# Patient Record
Sex: FEMALE | Race: BLACK OR AFRICAN AMERICAN | NOT HISPANIC OR LATINO | Employment: UNEMPLOYED | URBAN - METROPOLITAN AREA
[De-identification: names, ages, dates, MRNs, and addresses within clinical notes are randomized per-mention and may not be internally consistent; named-entity substitution may affect disease eponyms.]

---

## 2023-10-16 ENCOUNTER — OFFICE VISIT (OUTPATIENT)
Dept: OPHTHALMOLOGY | Facility: CLINIC | Age: 43
End: 2023-10-16
Payer: COMMERCIAL

## 2023-10-16 DIAGNOSIS — H52.13 MYOPIA, BILATERAL: Primary | ICD-10-CM

## 2023-10-16 DIAGNOSIS — H18.893 LIMBAL STEM CELL DEFICIENCY OF BOTH EYES: ICD-10-CM

## 2023-10-16 PROCEDURE — FLVLF CONTACT LENS EVALUATION (SP): Performed by: OPTOMETRIST

## 2023-10-16 PROCEDURE — 92015 DETERMINE REFRACTIVE STATE: CPT | Performed by: OPTOMETRIST

## 2023-10-16 PROCEDURE — 92014 COMPRE OPH EXAM EST PT 1/>: CPT | Performed by: OPTOMETRIST

## 2023-10-16 ASSESSMENT — CONF VISUAL FIELD
OD_INFERIOR_NASAL_RESTRICTION: 0
OS_SUPERIOR_TEMPORAL_RESTRICTION: 0
OD_SUPERIOR_NASAL_RESTRICTION: 0
OD_NORMAL: 1
OD_SUPERIOR_TEMPORAL_RESTRICTION: 0
OS_SUPERIOR_NASAL_RESTRICTION: 0
OD_INFERIOR_TEMPORAL_RESTRICTION: 0
METHOD: COUNTING FINGERS
OS_INFERIOR_TEMPORAL_RESTRICTION: 0
OS_NORMAL: 1
OS_INFERIOR_NASAL_RESTRICTION: 0

## 2023-10-16 ASSESSMENT — ENCOUNTER SYMPTOMS
ALLERGIC/IMMUNOLOGIC NEGATIVE: 0
MUSCULOSKELETAL NEGATIVE: 0
CONSTITUTIONAL NEGATIVE: 0
PSYCHIATRIC NEGATIVE: 0
CARDIOVASCULAR NEGATIVE: 0
HEMATOLOGIC/LYMPHATIC NEGATIVE: 0
NEUROLOGICAL NEGATIVE: 0
EYES NEGATIVE: 0
RESPIRATORY NEGATIVE: 0
ENDOCRINE NEGATIVE: 0
GASTROINTESTINAL NEGATIVE: 0

## 2023-10-16 ASSESSMENT — REFRACTION_CURRENTRX
OD_AXIS: 180
OS_BRAND: AIR OPTIX AQUA COLORS
OS_AXIS: 180
OS_BASECURVE: 8.6
OD_BRAND: AIR OPTIX AQUA COLORS
OS_SPHERE: -4.00
OD_DIAMETER: 14.2
OD_SPHERE: -4.00
OD_BASECURVE: 8.6
OS_CYLINDER: -0.75
OS_DIAMETER: 14.2
OD_CYLINDER: -0.75

## 2023-10-16 ASSESSMENT — REFRACTION
OD_AXIS: 150
OD_CYLINDER: -0.50
OS_SPHERE: -3.50
OD_SPHERE: -3.25

## 2023-10-16 ASSESSMENT — SLIT LAMP EXAM - LIDS
COMMENTS: NORMAL
COMMENTS: NORMAL

## 2023-10-16 ASSESSMENT — VISUAL ACUITY
OD_CC: 20/20
METHOD: SNELLEN - LINEAR
OS_CC: 20/20
VA_OR_OS_CURRENT_RX: 20/20
VA_OR_OD_CURRENT_RX: 20/20
CORRECTION_TYPE: GLASSES
OS_CC: 20/20
OD_CC: 20/20

## 2023-10-16 ASSESSMENT — REFRACTION_WEARINGRX
OD_CYLINDER: -0.50
OD_SPHERE: -3.25
OD_AXIS: 150
OS_SPHERE: -3.50
OS_CYLINDER: SPHERE

## 2023-10-16 ASSESSMENT — EXTERNAL EXAM - RIGHT EYE: OD_EXAM: NORMAL

## 2023-10-16 ASSESSMENT — REFRACTION_MANIFEST
OD_SPHERE: -3.25
OS_SPHERE: -3.50
OD_CYLINDER: -0.50
OD_AXIS: 150

## 2023-10-16 ASSESSMENT — CUP TO DISC RATIO
OD_RATIO: 0.40
OS_RATIO: 0.40

## 2023-10-16 ASSESSMENT — TONOMETRY
OD_IOP_MMHG: 14
OS_IOP_MMHG: 15
IOP_METHOD: GOLDMANN APPLANATION

## 2023-10-16 ASSESSMENT — EXTERNAL EXAM - LEFT EYE: OS_EXAM: NORMAL

## 2023-10-16 NOTE — PROGRESS NOTES
"Assessment/Plan   Diagnoses and all orders for this visit:  Myopia, bilateral  New spec rx released today per patient request. Ocular health wnl for age OU. Monitor 1 year or sooner prn. Refraction billed today.  Discussed proper wear, care, replacement of contact lenses. Gave handout. D/c cl wear and RTC if eyes become red, painful, irritated. Monitor 1 year.   CL eval billed today. 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 Continue w/ proper CL wear. RTC w/ worsening RSVP. Recommend use of artificial tears bid-qid OU. Discussed ATs work best when used consistently multiple times a day. Discussed brand options and preserved vs. PF. Avoid visine/cleareyes/\"get the red out\" drops. Coupon given.    "

## 2024-05-30 ENCOUNTER — OFFICE VISIT (OUTPATIENT)
Dept: OPHTHALMOLOGY | Facility: CLINIC | Age: 44
End: 2024-05-30
Payer: COMMERCIAL

## 2024-05-30 DIAGNOSIS — H04.123 DRY EYE SYNDROME OF BOTH EYES: ICD-10-CM

## 2024-05-30 DIAGNOSIS — H10.13 ALLERGIC CONJUNCTIVITIS OF BOTH EYES: Primary | ICD-10-CM

## 2024-05-30 PROCEDURE — 99213 OFFICE O/P EST LOW 20 MIN: CPT | Performed by: STUDENT IN AN ORGANIZED HEALTH CARE EDUCATION/TRAINING PROGRAM

## 2024-05-30 ASSESSMENT — CONF VISUAL FIELD
OS_NORMAL: 1
OD_SUPERIOR_NASAL_RESTRICTION: 0
OD_INFERIOR_NASAL_RESTRICTION: 0
OD_NORMAL: 1
OS_SUPERIOR_TEMPORAL_RESTRICTION: 0
OD_SUPERIOR_TEMPORAL_RESTRICTION: 0
OS_SUPERIOR_NASAL_RESTRICTION: 0
OS_INFERIOR_NASAL_RESTRICTION: 0
OD_INFERIOR_TEMPORAL_RESTRICTION: 0
OS_INFERIOR_TEMPORAL_RESTRICTION: 0

## 2024-05-30 ASSESSMENT — ENCOUNTER SYMPTOMS
EYES NEGATIVE: 0
CONSTITUTIONAL NEGATIVE: 0
HEMATOLOGIC/LYMPHATIC NEGATIVE: 0
ENDOCRINE NEGATIVE: 0
ALLERGIC/IMMUNOLOGIC NEGATIVE: 0
NEUROLOGICAL NEGATIVE: 0
GASTROINTESTINAL NEGATIVE: 0
RESPIRATORY NEGATIVE: 0
PSYCHIATRIC NEGATIVE: 0
MUSCULOSKELETAL NEGATIVE: 0
CARDIOVASCULAR NEGATIVE: 0

## 2024-05-30 ASSESSMENT — TONOMETRY
OS_IOP_MMHG: 16
OD_IOP_MMHG: 13
IOP_METHOD: TONOPEN

## 2024-05-30 ASSESSMENT — EXTERNAL EXAM - RIGHT EYE: OD_EXAM: NORMAL

## 2024-05-30 ASSESSMENT — VISUAL ACUITY
OS_CC: 20/25
OS_CC+: +3
OD_CC: 20/20
OS_PH_CC: 20/20
METHOD: SNELLEN - LINEAR
OS_PH_CC+: -1

## 2024-05-30 ASSESSMENT — EXTERNAL EXAM - LEFT EYE: OS_EXAM: NORMAL

## 2024-05-30 NOTE — PROGRESS NOTES
#Concretions  #Allergic conjunctivitis vs papillary reaction from CL  - 42 yo female with past ocular hx of limbal stem cell deficiency, myopia, CL use presenting for 1 week of left eye FBS, irritation, mucoid drainage, with no recent sick contacts or trauma to the eye.   - Entrance exam with excellent VA OU, normal IOP.   - SLE notable for mild papillary reaction OU, nasal coalesced concretions along MICHAEL, MGD, but no significant dry eye or conj abrasion.   - Overall, suspect symptoms are 2/2 possible allergic conjunctivitis vs MGD/evaporative dry eye  - no signs of limbal stem cell deficiency   - no signs of viral or bacterial infection    Recommendations:   - Start Olopatadine or Zaditor antihistamine eye drops BID, both eyes  - Start artificial tears qid, both eyes  - Start warm compresses BID   - Recommend CL holiday for 3-5 days  - F/u yearly as planned with Dr. Marcus, RTC sooner prn

## 2024-07-05 ENCOUNTER — APPOINTMENT (OUTPATIENT)
Dept: OPHTHALMOLOGY | Facility: CLINIC | Age: 44
End: 2024-07-05
Payer: COMMERCIAL

## 2024-09-18 ENCOUNTER — APPOINTMENT (OUTPATIENT)
Dept: ENDOCRINOLOGY | Facility: CLINIC | Age: 44
End: 2024-09-18
Payer: COMMERCIAL

## 2024-10-07 ENCOUNTER — APPOINTMENT (OUTPATIENT)
Dept: OPHTHALMOLOGY | Facility: CLINIC | Age: 44
End: 2024-10-07
Payer: COMMERCIAL

## 2024-10-07 DIAGNOSIS — H18.893 LIMBAL STEM CELL DEFICIENCY OF BOTH EYES: ICD-10-CM

## 2024-10-07 DIAGNOSIS — H52.13 MYOPIA, BILATERAL: Primary | ICD-10-CM

## 2024-10-07 PROCEDURE — 92014 COMPRE OPH EXAM EST PT 1/>: CPT | Performed by: OPTOMETRIST

## 2024-10-07 PROCEDURE — FLVLF CONTACT LENS EVALUATION (SP): Performed by: OPTOMETRIST

## 2024-10-07 PROCEDURE — 92015 DETERMINE REFRACTIVE STATE: CPT | Performed by: OPTOMETRIST

## 2024-10-07 RX ORDER — ASCORBIC ACID 500 MG
TABLET ORAL
COMMUNITY

## 2024-10-07 RX ORDER — ADAPALENE AND BENZOYL PEROXIDE GEL, 0.1%/2.5% 1; 25 MG/G; MG/G
GEL TOPICAL
COMMUNITY

## 2024-10-07 ASSESSMENT — REFRACTION_MANIFEST
OD_SPHERE: -3.75
OD_CYLINDER: -0.50
OS_CYLINDER: SPHERE
OS_SPHERE: -3.75
OD_AXIS: 150

## 2024-10-07 ASSESSMENT — REFRACTION_WEARINGRX
OD_AXIS: 150
OD_SPHERE: -3.25
OD_CYLINDER: -0.50
OS_SPHERE: -3.50

## 2024-10-07 ASSESSMENT — REFRACTION
OD_SPHERE: -3.75
OS_CYLINDER: SPHERE
OD_CYLINDER: -0.50
OS_SPHERE: -3.75
OD_AXIS: 170

## 2024-10-07 ASSESSMENT — SLIT LAMP EXAM - LIDS
COMMENTS: NORMAL
COMMENTS: NORMAL

## 2024-10-07 ASSESSMENT — EXTERNAL EXAM - LEFT EYE: OS_EXAM: NORMAL

## 2024-10-07 ASSESSMENT — TONOMETRY
OD_IOP_MMHG: 15
OS_IOP_MMHG: 18
IOP_METHOD: GOLDMANN APPLANATION

## 2024-10-07 ASSESSMENT — ENCOUNTER SYMPTOMS
CONSTITUTIONAL NEGATIVE: 0
NEUROLOGICAL NEGATIVE: 0
EYES NEGATIVE: 0
GASTROINTESTINAL NEGATIVE: 0
ALLERGIC/IMMUNOLOGIC NEGATIVE: 0
CARDIOVASCULAR NEGATIVE: 0
ENDOCRINE NEGATIVE: 0
PSYCHIATRIC NEGATIVE: 0
HEMATOLOGIC/LYMPHATIC NEGATIVE: 0
RESPIRATORY NEGATIVE: 0
MUSCULOSKELETAL NEGATIVE: 0

## 2024-10-07 ASSESSMENT — VISUAL ACUITY
VA_OR_OS_CURRENT_RX: 20/20
OS_CC: 20/20
OD_CC: 20/20
METHOD: SNELLEN - LINEAR
VA_OR_OD_CURRENT_RX: 20/20

## 2024-10-07 ASSESSMENT — EXTERNAL EXAM - RIGHT EYE: OD_EXAM: NORMAL

## 2024-10-07 ASSESSMENT — CONF VISUAL FIELD
OD_NORMAL: 1
OD_INFERIOR_TEMPORAL_RESTRICTION: 0
METHOD: COUNTING FINGERS
OS_NORMAL: 1
OS_INFERIOR_NASAL_RESTRICTION: 0
OD_SUPERIOR_NASAL_RESTRICTION: 0
OS_SUPERIOR_TEMPORAL_RESTRICTION: 0
OS_INFERIOR_TEMPORAL_RESTRICTION: 0
OS_SUPERIOR_NASAL_RESTRICTION: 0
OD_INFERIOR_NASAL_RESTRICTION: 0
OD_SUPERIOR_TEMPORAL_RESTRICTION: 0

## 2024-10-07 ASSESSMENT — REFRACTION_CURRENTRX
OD_DIAMETER: 14.2
OD_BRAND: AIR OPTIX COLORS
OD_CYLINDER: SPHERE
OD_SPHERE: -3.75
OS_BASECURVE: 8.6
OS_DIAMETER: 14.2
OS_CYLINDER: SPHERE
OS_BRAND: AIR OPTIX COLORS
OD_BASECURVE: 8.6
OS_SPHERE: -3.75

## 2024-10-07 ASSESSMENT — CUP TO DISC RATIO
OD_RATIO: 0.4
OS_RATIO: 0.4

## 2024-10-07 NOTE — PROGRESS NOTES
Assessment/Plan   Diagnoses and all orders for this visit:  Myopia, bilateral  New spec rx released today per patient request. Ocular health wnl for age OU. Monitor 1 year or sooner prn. Refraction billed today. Pt consents to receiving glasses Rx today. Patient's/guardian's signature obtained to acknowledge and confirm that a paper copy of glasses Rx was given to patient in compliance with [facility] Eyeglass Rule. Electronic copy of Rx will also be available via [employer].   Discussed proper wear, care, replacement of contact lenses. Gave handout. D/c cl wear and RTC if eyes become red, painful, irritated. Monitor 1 year.   CL eval billed today. 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 Recommend continue w/ approved CL wear and care. Recommend do not sleep in contact lenses.

## 2024-10-07 NOTE — PATIENT INSTRUCTIONS
"  Patient Information on Blepharitis/Meibomian Gland Dysfunction   (MGD)/Dry EYE    DRY EYE IS A CHRONIC PROBLEM THAT REQUIRES CHRONIC, CONSISTENT TREATMENT.  Symptoms include:  eye and eyelid irritation (foreign body sensation like an eyelash or grain of sand);  itchiness of the eye;  stinging/burning of the eye;     dryness of the eye;  tearing of the eyes;  red or bloodshot eyes;   blurry vision;  frequent “styes”  waxing and waning symptoms  eye fatigue and heaviness    What is Blepharitis?     Blepharitis comes from the [place] root “Bleph” meaning eyelid; “itis\" means inflammation.  So blepharitis is persistent in?ammation of the eyelids. Meibomian gland dysfunction is inflammation of the eyelid glands (MGD).      WHAT CAUSES BLEPHARITIS?    With the age of technology, blepharitis and dry eye has become more common.  This condition frequently occurs with computer use, frequent reading and intense visual use.  It is also common in people who have a tendency towards oily skin and dandruff. With blepharitis, both the upper and lower eyelids become coated with oily particles and bacteria near the base of the eyelashes.     Everyone has bacteria on the surface of their skin, but in some people, bacteria thrive in the skin at the base of the eyelashes. Large amounts of bacteria around the eyelashes can cause dandruff-like scales and particles to form along the lashes and eyelid margins. Blepharitis also is associated with meibomitis--dysfunction and in?ammation of the nearby oil glands of the eyelids (called meibomian glands).  This can commonly be associated with facial rosacea.    WHAT IS ROSACEA?    Rosacea (pronounced [id number]) is a chronic disease that affects both the skin and the eyelids. People with rosacea affecting their skin may ?ush easily and have redness and/or acne-like symptoms on their nose, cheeks, chin or forehead. More than half of people with rosacea affecting their skin have some symptoms of " ocular rosacea.     However, some people may have ocular rosacea without showing any skin symptoms. Approximately 13 million people in the [place] have rosacea. It usually occurs in adults (especially women) between the ages of 30 and 60. Although people of any skin color can develop rosacea, it tends to occur most frequently in people with fair skin.    WHAT CAUSES ROSACEA?   Scientists do not know what causes rosacea, but some think that genetics and environment may play a role. Some researchers believe that rosacea is a disorder that involves swelling of the blood vessels, resulting in ?ushing and redness. Other scientists think that a microscopic organism or mite in tiny facial hair follicles may clog the skin’s oil gland openings. Several factors are known to aggravate (but not cause) rosacea, including exposure to heat, sunlight, wind and cold; strenuous physical activity; drinking alcohol; consuming hot drinks or spicy foods; experiencing emotional stress; or coughing for long periods.  You may also be sensitive to particular foods in your diet.    WHAT IS MEIBOMIAN GLAND DYSFUNCTION (MGD)?  MGD, also termed posterior blepharitis, is the most common form of lid margin disease. In the early stages, patients are often asymptomatic, but if left unmanaged, MGD can cause or exacerbate dry eye symptoms and eyelid inflammation. The oil glands become blocked with thickened secretions. Chronically clogged glands eventually become unable to secrete oil which results in permanent changes in the tear film and dry eyes.    WHAT IS DRY EYE?  Dry eye is a multifactorial disease which disrupts the tear film and surface of the eye often resulting in symptoms of blurred vision (intermittent or constant) and discomfort (gritty/burning). If untreated dry eye can cause damage to the surface of the eye (cornea and conjunctiva). Dry eye is more common in women and tends to worsen with age. Dry eye can be caused by contact  lens over-wear, certain medications, systemic diseases, and can be worsened by ocular surgery (i.e. cataract surgery). A successful treatment plan for dry eye includes managing patient symptoms and reducing clinical signs.    WHAT MAKES UP A NORMAL TEAR FILM?    The normal tear film is made up of many components: Electrolytes, Proteins, Cytokines, Mucins, Anti-microbials, Latent Proteases, IgA, IgG, IgM, Polar Phospholipid. All of the normal tear components work to help keep the surface of the healthy, prevent infection, and heal any damaged tissue.    HOW ARE BLEPHARITIS, MGD, ROSACEA, AND DRY EYE TREATED?   These are all chronic conditions that cannot be cured but can be controlled with treatment:   Avoid eye drops that “get the red out.”  These drops can appear to improve symptoms of redness initially, but they cause the symptoms to worsen quickly.      Lubricant Eye Drops, Gels and Ointments (Sheryl?cial tears): Similar to brushing your teeth to prevent cavities, this is best as prevention for dry eye and in?ammation. Use ~4x a day.  If you occasionally or continually experience further symptoms, switch to a thicker artificial tear (lower on the list) 4x a day or another product in the same category. Your doctor may suggest preservative-free tears if you use drops more than 4X per day or you use other preserved drops such as for glaucoma.      Preserved Lubricant Eye Drops, Gels and Ointments  Thin: Refresh Tears, Tears Naturale Forte, Tears Naturale II, Blink Tears, Thera Tears, Moisture Eyes, GenTeal Mild, Hypo Tears, Akwa Tears, Tears Again, Isopto Tears, Soothe Hydration, Systane Ultra  Thick: Refresh Optive, Refresh Liquigel, Refresh Optive Gel Drops, Blink Gel Tears, Systane Balance, Systane Gel Drops, Tears Again Night and Day Gel, GenTeal Geldrops.  Thickest: Refresh Lacrilube ointment, Hypotears ointment, Tears Again ointment, Refresh PM ointment, GenTeal PM, Tears Naturale PM, Soothe Night Time ointment,  Puralube, Systane Gel  Oil based: Refresh Optive Advanced, Refresh Digital, Blink Triple Care    Preservative Free Lubricant Eye Drops or Gels (This avoids potential irritation from preservatives, though it is more costly, since they are non-preserved, use or discard single use vial within 24 hours)  Thin: Refresh Plus, Refresh Classic, Tears Naturale Free, Bion Tears, Blink Tears, Thera Tears, Systane Preservative Free, Nature’s Tears Eye Mist, Soothe, Similasan iVizia  Thick: Refresh Celluvisc, Refresh Optive Sensitive, Systane Ultra Preservative Free, TheraTears Liquid gel, Refresh PM Ointment.  Oil Based: Refresh Optive Roger-3, Systane Complete, Retaine MGD, Refresh Digital     Ointments: Pull the lower lid down to create a small pocket. Apply less than a grain of rice size amount into the pocket without touching the tip to the eye. Once placed, blink rapidly for approximately 2 minutes to distribute the ointment and eliminate the blur.    Warm compresses: Warm compresses help to clear the clogged gland. This will loosen debris around your eyelashes as well as opening the pores. It also helps thin oil secretions from nearby oil glands, preventing the development of a stye -- an enlarged lump caused by clogged oil secretions in the eyelid.  Using a microwave, heat the compress mask for 20-30 (follow package instructions) *preferred option  Ensure that the heat is not too hot to burn the back of the hand.   Apply to the lids for 8-15 minutes, rewarming to maintain adequate heat.  Alternatively, soak a clean washcloth in hot water; wring out cloth so it is warm and moist, however, this will need to be reheated every 1-2 minutes.  Some patients may note lid dryness, if this occurs, you may consider using ointment on the lid prior to performing the warm compress or placing a thin piece of fabric between the compress and eyelids.    LID MASSAGE:  Apply light pressure with your index finger to the lid margin near the  lash line.  Roll the finger upward on the lower lid while looking up, then roll the finger downward on the upper lid while looking down. Perform lid massage only after the warm compress typically once-twice per day. This can also be done in a circular motion.    Eyelid scrubs:  Avenova, this is a prescription antimicrobial lid application  Spray Avenova on a clean finger or lint free pad.   Gently scrub the base of your eyelashes with your eyes closed, do not rinse.  Ocusoft Hypochlor  Spray on a clean finger or lint free pad.   Gently scrub the base of your eyelashes with your eyes closed, do not rinse.    I Lid ‘n Lash Plus, this is a non-prescription tea-tree oil based lid scrub  Take 1 towelette from case.    Gently scrub the base of your eyelashes with your eyes closed.  Do not rinse, you may use other side of towelette for the other eye.  Rub the remaining over the forehead, cheeks, nose and eyebrows  You will feel a cool, menthol sensation.  You may rinse the remaining solution off or let it dry    I Lid ‘n Lash, this is a non-prescription oil based lid application  Take 1 towelette from case  Gently scrub the base of your eyelashes with your eyes closed.  Do not rinse, you may use other side of towelette for the other eye.  You may rinse the remaining solution off or let it dry.    Ocusoft Lid Scrubs Plus  Open one towelette.    Gently wipe the base of your eyelashes with your eyes closed.  Do not rinse, you may use other side of towelette for the other eye or use a fresh wipe    Baby Shampoo  Mix ½ warm water with ½ baby shampoo.  Dip a clean washcloth, cotton swab or commercial lint-free pad into the mixture.  Gently scrub the base of your eyelashes with your eyes closed,   Rinse with water.    Eyelid scrubs should only be done after warm compresses.  This is similar to cleaning cold butter from a frying pan. You need to melt the oils first with an adequate amount of time and heat and then scrub out any  remaining dirt, debris, or any bad oils. Since you cannot make your eyelids as hot as the stove, you need more time.    Diet change:  Reduce you intake of fried/fatty foods.  Also reduce your intake of processed foods.  This should be substituted with salmon, tuna, and walnuts which provide healthy fats.      Antibiotic ophthalmic ointment: your doctor may prescribe an antibiotic ophthalmic gel or ointment such as Azithromycin, Bacitracin or Erythromycin to be placed on the eyelids following the warm compresses and lid scrubs, this ointment will be safe to put in the eye.                  To treat your DRY EYE, blepharitis and/OR MGD:    Treatment Dose or Number of times daily   Warm compresses  MGDRx Bag  [place]erson  Thermalon  EyeEco   1-2X daily for 8-15 minutes   Lid massage   1-2X daily   Lid Scrubs/Applications  Avenova (Amazon)  I-Lid ‘n Lash/Plus  Ocusoft Lid Wipes  Baby Shampoo  Other product____   1-2X daily   Xdemvy (lotilaner) 1 drop 2x daily (12 hrs apart) for 6 weeks   Lubricant Eye Drops, Gels, or Ointments [Preservative Free]   3-4X daily AND OR Gel / Ointment Nightly   Miebo (perfluorohexyloctane) 1 drop 4x daily   Restasis / Xiidra / Cequa/ cyclosporine 0.05% 1 drop in each eye 2x daily  *side effects burning/stinging, redness, intermittent blur, strange taste*     Antibiotic Gel or Ointment,  Doxycycline Tablets   Every other day 1X daily 2X daily   Steroid: Prednisolone Acetate, Fluoromethalone (FML), Loteprednol (Lotemax), Eysuvius   R / L / Both 4X/day  3X/day  2X/day  1X/day ____ weeks    ____ days   Tyrvaya 1 spray into side wall of each nostril with gentle inhale 2x/day  *side effects sneezing, strange taste   Autologous Serum Tears  20% ; 50% ; other 1 drop into each eye: 2-4xday,  4-6x/day,  6+ x/day

## 2024-10-14 ENCOUNTER — DOCUMENTATION (OUTPATIENT)
Dept: ENDOCRINOLOGY | Facility: CLINIC | Age: 44
End: 2024-10-14

## 2024-10-14 ENCOUNTER — APPOINTMENT (OUTPATIENT)
Dept: ENDOCRINOLOGY | Facility: CLINIC | Age: 44
End: 2024-10-14
Payer: COMMERCIAL

## 2024-10-14 NOTE — PROGRESS NOTES
"Patient is seen at the request of self for my opinion regarding Weight Management. My final recommendations will be communicated back to the requesting provider by way of shared medical record.    NEW  Subjective   Clif De is a 44 y.o. female with a hx of dry conjunctivitis, B knee pain, asthma, ventral hernia who presents for weight management and obesity.    1. Weight history: Has struggled with weight for the last 9 years.       Previous weight loss attempts: exercising 7 days a week and avoided sugars- lost 45lbs over 4-5 months     2. Sleep: Insufficient      3. Stress: \"varies\"      4. Exercise: None at this time      5. Appetite control: Decreased  Obesity medication: N/A    Diet Recall-  Breakfast- 3 eggs scrambled with cheese and turkey sausage/protein shake   Lunch- banana and protein shake   Dinner- green vegetable with some type of meat   Daily Snacks- cake/cookies/brownies/ice cream  Beverages- water   Alcohol- no     Any personal history of pancreatitis?: No  Any personal or family history of medullary thyroid cancer or MEN (multiple endocrine neoplasia)?: No      Current Outpatient Medications:     adapalene-benzoyl peroxide 0.1-2.5 % gel, 1 application to affected area Externally to entire face night for 30 days, Disp: , Rfl:     loratadine-pseudoephedrine (Claritin-D 12-hour) 5-120 mg 12 hr tablet, Take 1 tablet by mouth 2 times a day. Do not crush, chew, or split., Disp: , Rfl:     ROS:  System: normal  Eyes : no visual changes  Neck : no tenderness, no new lumps/bumps  Respiratory : no SOB  Cardiovascular : no chest pain, no palpitations  Gastro-Intestinal : no abdominal concerns  Neurological : no numbness or tingling in the extremities  Musculoskeletal : no joint paint, no muscle pain  Skin : no unusual rashes  Psychiatric : no depression, no anxiety  See [facility] for Endocrine ROS    Past Medical History:   Diagnosis Date    Incompetence of cervix uteri 08/28/2014    Incompetent cervix    " Personal history of other diseases of the respiratory system 08/28/2014    Personal history of asthma       Past Surgical History:   Procedure Laterality Date    DILATION AND CURETTAGE OF UTERUS  06/10/2014    Dilation And Curettage    OTHER SURGICAL HISTORY  08/28/2014    Cervical Cerclage During Pregnancy       Social History     Socioeconomic History    Marital status:      Spouse name: Not on file    Number of children: Not on file    Years of education: Not on file    Highest education level: Not on file   Occupational History    Not on file   Tobacco Use    Smoking status: Never    Smokeless tobacco: Not on file   Substance and Sexual Activity    Alcohol use: Never    Drug use: Never    Sexual activity: Not on file   Other Topics Concern    Not on file   Social History Narrative    Not on file     Social Determinants of Health     Financial Resource Strain: Low Risk  (9/22/2023)    Received from [facility]    Overall Financial Resource Strain (CARDIA)     Difficulty of Paying Living Expenses: Not hard at all   Food Insecurity: No Food Insecurity (9/22/2023)    Received from [facility]    Hunger Vital Sign     Worried About Running Out of Food in the Last Year: Never true     Ran Out of Food in the Last Year: Never true   Transportation Needs: No Transportation Needs (9/22/2023)    Received from [facility]    PRAPARE - Transportation     Lack of Transportation (Medical): No     Lack of Transportation (Non-Medical): No   Physical Activity: Sufficiently Active (9/22/2023)    Received from [facility]    Exercise Vital Sign     Days of Exercise per Week: 6 days     Minutes of Exercise per Session: 120 min   Stress: No Stress Concern Present (9/22/2023)    Received from [facility]    [place] [employer] - Occupational Stress Questionnaire     Feeling of Stress : Not at all   Social Connections: Moderately Isolated  (9/22/2023)    Received from [facility]    Social Connection and Isolation Panel [NHANES]     Frequency of Communication with Friends and Family: Three times a week     Frequency of Social Gatherings with Friends and Family: Three times a week     Attends Denominational Services: 1 to 4 times per year     Active Member of Clubs or Organizations: No     Attends Club or Organization Meetings: Patient declined     Marital Status:    Intimate Partner Violence: Not At Risk (9/22/2023)    Received from [facility]    Humiliation, Afraid, Rape, and Kick questionnaire     Fear of Current or Ex-Partner: No     Emotionally Abused: No     Physically Abused: No     Sexually Abused: No   Housing Stability: Unknown (9/22/2023)    Received from [facility]    Housing Stability Vital Sign     Unable to Pay for Housing in the Last Year: No     Number of Places Lived in the Last Year: Not on file     Unstable Housing in the Last Year: No       Objective    Physical Exam:  There were no vitals taken for this visit.  General : alert and oriented X3, no acute distress    Assessment/Plan   Clif De is a 44 y.o. female with a hx of dry conjunctivitis, B knee pain, asthma, ventral hernia who presents for weight management and obesity.    I will summarize the interaction with the patient.  The patient was on her phone and writing a message when I stepped into the room, and continued to be writing even when we started to speak.  She was difficult to speak with from the beginning.  She said that she was scheduled with an NP prior to seeing me, and was angry that she was not seeing the doctor.  She cancelled that appointment.  She was scheduled with Maria De Jesus Llamas CNP - who is part of this weight management program.  However, since Maria De Jesus was not a physician, Clif was angry and cancelled with her.  Then she was scheduled with me for November 2024, and we were able to accommodate her earlier,  "and bring her in today (10/14/2024).  I tried to explain to the patient that the structure of this program is to be seen in group visits.  Before I could explain further, she interrupted me and said \"I don't have time or interest in group visits\".  I apologized several times to her, but explained that this is the strucutre of the program.  I offered for her to be seen by my Dietician, and/or another Physician at  who cares for obesity.  She said that she is unhappy with the [facility] [employer] system, that she wants us to pay for her car's gasoline.  I said we can refund her co-pay (if she has one) for this appointment and not charge her for the visit.  She was very mean in her tone to me, and very difficult throughout the visit.     I will be calling the [facility] to inform them of this negative interaction with the patient.  The patient was offered all the help we can provide, and she was demanding and difficult.     Minal Casarez MD    "

## 2024-11-19 ENCOUNTER — APPOINTMENT (OUTPATIENT)
Dept: ENDOCRINOLOGY | Facility: CLINIC | Age: 44
End: 2024-11-19
Payer: COMMERCIAL